# Patient Record
Sex: MALE | Race: WHITE | Employment: FULL TIME | URBAN - METROPOLITAN AREA
[De-identification: names, ages, dates, MRNs, and addresses within clinical notes are randomized per-mention and may not be internally consistent; named-entity substitution may affect disease eponyms.]

---

## 2023-05-26 ENCOUNTER — APPOINTMENT (OUTPATIENT)
Dept: GENERAL RADIOLOGY | Age: 28
End: 2023-05-26
Attending: EMERGENCY MEDICINE

## 2023-05-26 ENCOUNTER — HOSPITAL ENCOUNTER (EMERGENCY)
Age: 28
Discharge: HOME | End: 2023-05-26
Attending: EMERGENCY MEDICINE

## 2023-05-26 VITALS
SYSTOLIC BLOOD PRESSURE: 148 MMHG | TEMPERATURE: 98 F | HEART RATE: 84 BPM | RESPIRATION RATE: 16 BRPM | BODY MASS INDEX: 17.82 KG/M2 | DIASTOLIC BLOOD PRESSURE: 88 MMHG | OXYGEN SATURATION: 98 % | WEIGHT: 138.89 LBS | HEIGHT: 74 IN

## 2023-05-26 DIAGNOSIS — S81.811A LACERATION OF RIGHT LOWER EXTREMITY EXCLUDING THIGH, INITIAL ENCOUNTER: Primary | ICD-10-CM

## 2023-05-26 PROCEDURE — 73590 X-RAY EXAM OF LOWER LEG: CPT

## 2023-05-26 PROCEDURE — 12001 RPR S/N/AX/GEN/TRNK 2.5CM/<: CPT

## 2023-05-26 PROCEDURE — 99283 EMERGENCY DEPT VISIT LOW MDM: CPT

## 2023-05-26 ASSESSMENT — PAIN - FUNCTIONAL ASSESSMENT
PAIN_FUNCTIONAL_ASSESSMENT: 0-10
PAIN_FUNCTIONAL_ASSESSMENT: NONE - DENIES PAIN

## 2023-05-26 ASSESSMENT — PAIN DESCRIPTION - LOCATION: LOCATION: LEG

## 2023-05-26 ASSESSMENT — PAIN DESCRIPTION - DESCRIPTORS: DESCRIPTORS: DISCOMFORT

## 2023-05-26 ASSESSMENT — PAIN DESCRIPTION - ORIENTATION: ORIENTATION: RIGHT;LOWER

## 2023-05-26 ASSESSMENT — PAIN SCALES - GENERAL: PAINLEVEL_OUTOF10: 4

## 2023-05-27 NOTE — ED NOTES
Dr. Marilyn Espinoza placed one suture in laceration. Area cleansed after suturing, antibiotic ointment, non adhering dressing and coban applied. Patient instructed to have sutures removed in one week.       Vishal Gao RN  05/26/23 [id number]

## 2023-05-27 NOTE — ED PROVIDER NOTES
[facility] Emergency Department      Pt Name: Nadiya Grullon  MRN: [id number]  [place] 1995  Date of evaluation: 5/26/2023  Provider: Avel Gannon MD  CHIEF COMPLAINT  Chief Complaint   Patient presents with    Leg Injury     Reports getting hit in right lower leg w full weight of the trailer tongue around 5p / swelling and small lac to anterior right lower leg/ no active bleeding. Took 2 aleve      HPI  Nadiya Grullon is a 33 y.o. male who presents because of right leg pain. He sustained injury from the trailer hitch at about 5 PM.  It punctured the skin to the mid shin. He took 2 Aleve at home. His tetanus status is up-to-date. He denies any other injury. He does have numbness and tingling which goes from the low the knee down to the foot. Denies any difficulty with movement or walking. REVIEW OF SYSTEMS:  No other injury, swelling Pertinent positives and negatives as per the HPI. All other pertinent review of systems reviewed and negative. Nursing notes reviewed. PAST MEDICAL HISTORY  No past medical history on file. SURGICAL HISTORY  No past surgical history on file. MEDICATIONS:  No current facility-administered medications on file prior to encounter. No current outpatient medications on file prior to encounter. ALLERGIES  Patient has no known allergies. SOCIAL HISTORY:     IMMUNIZATIONS:  Noncontributory    PHYSICAL EXAM  VITAL SIGNS:  Blood pressure (!) 151/89, pulse 86, temperature 98.2 °F (36.8 °C), temperature source Tympanic, resp. rate 18, height 6' 2\" (1.88 m), weight 138 lb 14.2 oz (63 kg), SpO2 97 %.   Constitutional:  33 y.o. male who does not appear toxic  HENT:  Atraumatic, mucous membranes moist  Eyes:   Conjunctiva clear, no icterus  Neck:  Supple, no signs of injury  Thorax & Lungs:  Respiratory effort normal  Abdomen:  Non distended  Back:  No deformity  Extremity: There is swelling to the mid shin right with a laceration that is about 1 cm long and not

## 2023-05-27 NOTE — ED NOTES
Discharge instructions reviewed with patient and verbalized understanding, denies further questions and successful teach back occurred. Offered wheelchair for discharge and declined. Discharged ambulatory with steady gait to ED lobby. Written discharge instructions provided to patient.       Maricel Lopes RN  05/26/23 [id number]